# Patient Record
Sex: FEMALE | Race: WHITE | NOT HISPANIC OR LATINO | Employment: FULL TIME | URBAN - METROPOLITAN AREA
[De-identification: names, ages, dates, MRNs, and addresses within clinical notes are randomized per-mention and may not be internally consistent; named-entity substitution may affect disease eponyms.]

---

## 2017-12-12 ENCOUNTER — ALLSCRIPTS OFFICE VISIT (OUTPATIENT)
Dept: OTHER | Facility: OTHER | Age: 22
End: 2017-12-12

## 2017-12-13 NOTE — PROGRESS NOTES
Assessment  1  Encounter for routine pelvic examination (V72 31) (Z01 419)   2  Cervical cancer screening (V76 2) (Z12 4)    Plan     · (1) CHLAMYDIA/GC AMPLIFIED DNA, PCR; Source:Cervix; Status:Active; Requestedfor:70Cbm3331;    Perform:[facility]; Due:43Bpq8042; Ordered; For:Cervical cancer screening, Encounter for routine pelvic examination, Screening for cervical cancer; Ordered By:Aisha Alejo;   · (LC) Pap IG, Ct-Ng, rfx HPV ASCU; Status:Active; Requested for:27Qxp6790;    Perform:[facility]; Order Comments:FDLMP=12/07/2017; Due:80Jpz8088; Ordered; For:Cervical cancer screening, Encounter for routine pelvic examination, Screening for cervical cancer; Ordered By:Aisha Alejo;     · Follow-up visit in 1 year Evaluation and Treatment  Follow-up  Status: Complete  Done:12Dec2017   Ordered; For: Encounter for routine pelvic examination; Ordered By: Ashley Reza Performed:  Due: 42TLW0173; Last Updated By: Tessa Kenney; 12/12/2017 4:35:32 PM     · Ortho Tri-Cyclen Lo 0 18/0 215/0 25 MG-25 MCG Oral Tablet (Norgestim-EthEstrad Triphasic); TAKE 1 TABLET DAILY   Rx By: Ashley Reza; Dispense: 28 Days ; #:1 X 28 Tablet Disp Pack; Refill: 11; For: Encounter for routine pelvic examination, SocHx: Sexually Active, Contraception Desired; ANTONINO = N; Print Rx   · (1) CBC/PLT/DIFF; Status:Active; Requested VSE:65KDT1263; Perform:[facility]; SKL:94LGB7366;XKVDBGF;CXK:STQSZF Maintenance; Ordered By:Aisha Alejo;   · (1) COMPREHENSIVE METABOLIC PANEL; Status:Active; Requested NZS:43TQN4253; Perform:[facility]; YNU:04VEZ6033;SQDHVXW;GAVBOPRFZHQ; Ordered By:Aisha Alejo;   · (1) LIPID PANEL FASTING W DIRECT LDL REFLEX; Status:Active; Requestedfor:02Jan2018; Perform:[facility]; HBW:95KVU6636;YEQGEXY;RTJ:XJZILZ Maintenance; Ordered By:Aisha Alejo;   · (1) TSH WITH FT4 REFLEX; Status:Active; Requested FRV:57NHA7492;     Perform:[facility]; LKV:90AAU9282;XNSRJDZ;HDCSPHRXOBS; Ordered By:Julian Alejo;    Discussion/Summary  health maintenance visit Currently, she eats a healthy diet and has an adequate exercise regimen  the risks and benefits of cervical cancer screening were discussed cervical cancer screening is current Pap test was done today Testing was done today for chlamydia and gonorrhea  Advice and education were given regarding nutrition, aerobic exercise, cardiovascular risk reduction, sunscreen use, self skin examination, fall risk reduction and advanced directive planning  The patient had a normal pelvic exam today in the office  Will follow up with results of the Pap  She will continue with her healthy diet and exercise  We will continue her on the Ortho Tri-Cyclen Lo and have reordered it  We will see her back as scheduled in a year  Possible side effects of new medications were reviewed with the patient/guardian today  The treatment plan was reviewed with the patient/guardian  The patient/guardian understands and agrees with the treatment plan      Chief Complaint  Annual PE/PAP      History of Present Illness  HPI: Jaylen Myers is a 22 y/o female who presents for her annual pelvic exam and pap  She is doing well with the Ortho Tri-Cyclen Lo  Her periods have been regular on the OCP  There is no dysfunctional uterine bleeding  There is no vaginal discharge or itching  There is no pelvic pain  The patient denies any chest pain, shortness of breath, or palpitations  There is no edema  There are no headaches or visual changes  There is no lightheadedness, dizziness, or fainting spells  GYN HM, Adult Female [facility] Joelle: The patient is being seen for a health maintenance evaluation  General Health: The patient's health since the last visit is described as good  She has regular dental visits  -- She denies vision problems  -- She denies hearing loss  Lifestyle:  She consumes a diverse and healthy diet  -- She does not have any weight concerns  -- She exercises regularly  -- She does not use tobacco -- She denies alcohol use  -- She denies drug use  Reproductive health: the patient is premenopausal    Screening:      Review of Systems  no pelvic pain,-- no pelvic pressure,-- no vaginal pain,-- no vaginal discharge,-- no vaginal itching,-- no vaginal lump or mass,-- no vaginal odor,-- no nonmenstrual bleeding,-- did not miss the most recent period,-- not thinking she may be pregnant,-- no vulvar pain,-- no vulvar itching,-- no vulvar lump or mass,-- no labial swelling,-- not premenopausal,-- no dysmenorrhea,-- denied amenorrhea,-- no menorrhagia,-- no hypomenorrhea,-- no oligomenorrhea,-- no decreased time between periods,-- periods are regular,-- regular length of periods,-- no dysuria,-- no bladder pain,-- no hematuria,-- no burning sensation during urination,-- no change in urinary frequency,-- no feelings of urinary urgency,-- no flank pain,-- no abnormal urethral discharge,-- urine is not foul-smelling,-- no urinary hesitancy,-- no incomplete emptying of bladder,-- initiating urination does not require straining,-- no painful inability to urinate,-- urine stream was not smaller,-- urinary stream does not start and stop,-- no oliguria,-- urine not cloudy-- and-- no urinary loss of control  Active Problems  1  Cervical cancer screening (V76 2) (Z12 4)   2  Change in bowel habits (787 99) (R19 4)   3  Encounter for routine pelvic examination (V72 31) (Z01 419)   4  Needs flu shot (V04 81) (Z23)   5  Screening for cervical cancer (V76 2) (Z12 4)   6  Sexually Active, Contraception Desired    Past Medical History   · History of Acute bronchitis due to Haemophilus influenzae (466 0,041 5) (J20 1)   · History of Acute constipation (564 00) (K59 00)   · History of head injury (V15 59) (C99 294)    The active problems and past medical history were reviewed and updated today  Surgical History   · Denied: History Of Prior Surgery    The surgical history was reviewed and updated today         Family History   · No pertinent family history   · Family history of No Significant Family History    The family history was reviewed and updated today  Social History     · Denied: History of Alcohol Use (History)   · Daily Coffee Consumption (1  Cups/Day)   · Denied: History of Drug Use   · Exercising Regularly   · Never A Smoker   · Denied: History of Sexually Active With Persons At Risk For HIV-related Disease   · Sexually Active, Contraception Desired   · Uses Safety Equipment - Seatbelts  The social history was reviewed and updated today  The social history was reviewed and is unchanged  Current Meds   1  Ortho Tri-Cyclen Lo 0 18/0 215/0 25 MG-25 MCG Oral Tablet; TAKE 1 TABLET DAILY; Therapy: 80NVW9210 to (Evaluate:22Apr2018)  Requested for: 43Glp2458; Last Rx:93Kvn7144 Ordered    Allergies    1  Augmentin TABS    Vitals   ** Printed in Appendix #1 below  Physical Exam   Constitutional  General appearance: No acute distress, well appearing and well nourished  Neck  Neck: Normal, supple, trachea midline, no masses  Thyroid: Normal, no thyromegaly  Pulmonary  Respiratory effort: No increased work of breathing or signs of respiratory distress  Auscultation of lungs: Clear to auscultation  Cardiovascular  Auscultation of heart: Normal rate and rhythm, normal S1 and S2, no murmurs  Peripheral vascular exam: Normal pulses Throughout  Genitourinary  External genitalia: Normal and no lesions appreciated  Vagina: Normal, no lesions or dryness appreciated  Urethra: Normal    Urethral meatus: Normal    Bladder: Normal, soft, non-tender and no prolapse or masses appreciated  Cervix: Normal, no palpable masses  Uterus: Normal, non-tender, not enlarged, and no palpable masses  Adnexa/parametria: Normal, non-tender and no fullness or masses appreciated  Anus, perineum, and rectum: Normal sphincter tone, no masses, and no prolapse  Chest  Breasts: Normal and no dimpling or skin changes noted     Abdomen  Abdomen: Normal, non-tender, and no organomegaly noted    Liver and spleen: No hepatomegaly or splenomegaly  Examination for hernias: No hernias appreciated  Lymphatic  Palpation of lymph nodes in neck, axillae, groin and/or other locations: No lymphadenopathy or masses noted  Skin  Skin and subcutaneous tissue: Normal skin turgor and no rashes     Palpation of skin and subcutaneous tissue: Normal    Psychiatric  Orientation to person, place, and time: Normal    Mood and affect: Normal        Signatures   Electronically signed by : Hugo Dawkins DO; Dec 12 2017  7:03PM EST                       (Author)    Appendix #1  Patient: Piero Ford; : 1995; MRN: [id number]   Recorded: 21Uhp3401 04:17PM Recorded: 38Dms9818 04:08PM Recorded: 91Ris3699 04:06PM   Heart Rate   78, L Radial   Pulse Quality   Regular, L Radial   Systolic   017, LUE, Sitting   Diastolic   80, LUE, Sitting   Height   5 ft 3 5 in   Weight   150 lb    BMI Calculated   26 15   BSA Calculated   1 72   LMP 69SCO3848 77KCG7155

## 2017-12-17 LAB
ADEQUACY: (HISTORICAL): NORMAL
CHLAMYDIA DFA, NAA OR PCR (HISTORICAL): NEGATIVE
CLINICIAN PROVIDIED ICD 9 OR 10 (HISTORICAL): NORMAL
COMMENT (HISTORICAL): NORMAL
DIAGNOSIS (HISTORICAL): NORMAL
GC, DNA PROB (HISTORICAL): NEGATIVE
PERFORMED BY (HISTORICAL): NORMAL
REFLEX (HISTORICAL): NORMAL
TEST INFORMATION (HISTORICAL): NORMAL

## 2018-01-10 NOTE — MISCELLANEOUS
Message   Recorded as Task   Date: 09/12/2016 06:28 PM, Created By: Yang Myles   Task Name: Go to Result   Assigned To: Robbie Sierra   Regarding Patient: Bernardo Smith, Status: Active   Comment:    Aisha Alejo - 12 Sep 2016 6:28 PM     TASK CREATED  Please let the patient know that her labs were okay except her triglycerides were mildly elevated  She should watch her diet and decrease her carbohydrate intake and increase her exercise  We will plan on rechecking these again in a year  Spoke with Mother, Candi Casey and she was informed      Active Problems    1  Change in bowel habits (787 99) (R19 4)   2  Encounter for routine pelvic examination (V72 31) (Z01 419)   3  Needs flu shot (V04 81) (Z23)   4  Screening for cervical cancer (V76 2) (Z12 4)   5  Sexually Active, Contraception Desired    Current Meds   1  Ortho Tri-Cyclen Lo 0 18/0 215/0 25 MG-25 MCG Oral Tablet; TAKE 1 TABLET DAILY; Therapy: 09EVO7086 to (Evaluate:29Jan2017)  Requested for: 40Kvs9083; Last   Rx:72Jph5428 Ordered    Allergies    1   Augmentin TABS    Signatures   Electronically signed by : Alice Valenzuela, ; Sep 13 2016  8:29AM EST                       (Author)

## 2018-01-12 NOTE — MISCELLANEOUS
Message   Recorded as Task   Date: 07/05/2016 09:26 AM, Created By: Vito Henry   Task Name: Go to Result   Assigned To: Robert Toledo   Regarding Patient: Gina Guillory, Status: Active   Comment:    Vito Henry - 05 Jul 2016 9:26 AM     TASK CREATED  Please let the patient know that the obstruction series was normal   Follow up as needed  Bianca,April - 05 Jul 2016 9:57 AM     TASK EDITED  Mother took Nikhil Moreno, daughter is away        Active Problems    1  Acute constipation (564 00) (K59 00)   2  Change in bowel habits (787 99) (R19 4)   3  Head injury (959 01) (S09 90XA)   4  Sexually Active, Contraception Desired    Current Meds   1  Ortho Tri-Cyclen Lo 0 18/0 215/0 25 MG-25 MCG Oral Tablet (Norgestim-Eth Estrad   Triphasic); TAKE 1 TABLET DAILY; Therapy: 38GVT6761 to (Evaluate:20Vry8321)  Requested for: 40CBH8083; Last   Rx:50Gcg6996 Ordered   2  Polyethylene Glycol 3350 Oral Packet (MiraLax); MIX 1 PACKET IN 8 OUNCES OF   LIQUID AND DRINK ONCE DAILY; Therapy: 23YFP1646 to (Evaluate:66Kdn4628); Last Rx:25Efk9952 Ordered    Allergies    1   Augmentin TABS    Signatures   Electronically signed by : Alice Valenuzela, ; Jul 5 2016  9:57AM EST                       (Author)

## 2018-01-13 NOTE — MISCELLANEOUS
Message   Recorded as Task   Date: 09/08/2016 11:47 PM, Created By: Danni Storm   Task Name: Go to Result   Assigned To: Cordell Conteh   Regarding Patient: Edil Chairez, Status: Active   Comment:    Aisha Alejo - 08 Sep 2016 11:47 PM     TASK CREATED  Please let the patient know that her pap was normal    9/9/2016 Patient notified  trb      Active Problems    1  Change in bowel habits (787 99) (R19 4)   2  Encounter for routine pelvic examination (V72 31) (Z01 419)   3  Needs flu shot (V04 81) (Z23)   4  Screening for cervical cancer (V76 2) (Z12 4)   5  Sexually Active, Contraception Desired    Current Meds   1  Ortho Tri-Cyclen Lo 0 18/0 215/0 25 MG-25 MCG Oral Tablet (Norgestim-Eth Estrad   Triphasic); TAKE 1 TABLET DAILY; Therapy: 30XZW6667 to (Evaluate:29Jan2017)  Requested for: 82Wiz1900; Last   Rx:08Cod1941 Ordered    Allergies    1  Augmentin TABS    Signatures   Electronically signed by :  Richy Haines, ; Sep  9 2016  3:47PM EST                       (Author)

## 2018-01-23 VITALS
DIASTOLIC BLOOD PRESSURE: 80 MMHG | BODY MASS INDEX: 25.61 KG/M2 | SYSTOLIC BLOOD PRESSURE: 124 MMHG | WEIGHT: 150 LBS | HEART RATE: 78 BPM | HEIGHT: 64 IN

## 2018-04-02 RX ORDER — NORGESTIMATE AND ETHINYL ESTRADIOL
KIT
Qty: 28 TABLET | Refills: 7 | OUTPATIENT
Start: 2018-04-02

## 2018-04-03 DIAGNOSIS — Z30.9 ENCOUNTER FOR CONTRACEPTIVE MANAGEMENT, UNSPECIFIED TYPE: Primary | ICD-10-CM

## 2018-04-03 RX ORDER — NORGESTIMATE AND ETHINYL ESTRADIOL
KIT
Qty: 28 TABLET | Refills: 7 | Status: SHIPPED | OUTPATIENT
Start: 2018-04-03

## 2018-11-07 DIAGNOSIS — Z30.9 ENCOUNTER FOR CONTRACEPTIVE MANAGEMENT, UNSPECIFIED TYPE: ICD-10-CM

## 2018-11-07 RX ORDER — NORGESTIMATE AND ETHINYL ESTRADIOL
KIT
Qty: 28 TABLET | Refills: 7 | OUTPATIENT
Start: 2018-11-07